# Patient Record
Sex: FEMALE | URBAN - METROPOLITAN AREA
[De-identification: names, ages, dates, MRNs, and addresses within clinical notes are randomized per-mention and may not be internally consistent; named-entity substitution may affect disease eponyms.]

---

## 2020-10-01 ENCOUNTER — TELEPHONE (OUTPATIENT)
Dept: TRANSPLANT | Facility: CLINIC | Age: 40
End: 2020-10-01

## 2020-10-01 NOTE — TELEPHONE ENCOUNTER
"Malissa Arevalo called and stated that she is interested in becoming a living donor for her friend, Lashon De La Cruz.  Medical and social history obtained.  Patient reports she is 5'6", 210#, BMI 34. She is blood type B (recipient is O). Informed that her blood type is not compatible with the recipient. Discussed the option of paired kidney donation.  All questions answered.  Kidney donor information book emailed to patient for review. Instructed to contact me with questions or to schedule testing once 10# weight loss achieved. Patient verbalized understanding.  "

## 2021-08-04 ENCOUNTER — DOCUMENTATION ONLY (OUTPATIENT)
Dept: TRANSPLANT | Facility: CLINIC | Age: 41
End: 2021-08-04